# Patient Record
Sex: MALE | Race: WHITE | NOT HISPANIC OR LATINO | URBAN - METROPOLITAN AREA
[De-identification: names, ages, dates, MRNs, and addresses within clinical notes are randomized per-mention and may not be internally consistent; named-entity substitution may affect disease eponyms.]

---

## 2017-01-16 ENCOUNTER — FOLLOW UP ESTABLISHED (OUTPATIENT)
Dept: URBAN - METROPOLITAN AREA CLINIC 64 | Facility: CLINIC | Age: 57
End: 2017-01-16

## 2017-01-16 DIAGNOSIS — H52.223 REGULAR ASTIGMATISM, BILATERAL: Primary | ICD-10-CM

## 2017-01-16 PROCEDURE — S0621 ROUTINE OPHTHALMOLOGICAL EXA: HCPCS | Performed by: OPTOMETRIST

## 2017-01-16 ASSESSMENT — VISUAL ACUITY
OD: 20/20
OS: 20/20

## 2017-01-16 ASSESSMENT — INTRAOCULAR PRESSURE
OS: 22
OD: 21

## 2017-01-16 ASSESSMENT — KERATOMETRY
OD: 37.88
OS: 38.70

## 2018-04-05 ENCOUNTER — FOLLOW UP ESTABLISHED (OUTPATIENT)
Dept: URBAN - METROPOLITAN AREA CLINIC 64 | Facility: CLINIC | Age: 58
End: 2018-04-05
Payer: COMMERCIAL

## 2018-04-05 DIAGNOSIS — H52.213 IRREGULAR ASTIGMATISM, BILATERAL: Primary | ICD-10-CM

## 2018-04-05 PROCEDURE — 92015 DETERMINE REFRACTIVE STATE: CPT | Performed by: OPTOMETRIST

## 2018-04-05 PROCEDURE — 92014 COMPRE OPH EXAM EST PT 1/>: CPT | Performed by: OPTOMETRIST

## 2018-04-05 ASSESSMENT — VISUAL ACUITY
OS: 20/25
OD: 20/25

## 2018-04-05 ASSESSMENT — INTRAOCULAR PRESSURE
OS: 18
OD: 13

## 2018-04-05 ASSESSMENT — KERATOMETRY
OD: 37.42
OS: 38.94

## 2019-05-07 ENCOUNTER — FOLLOW UP ESTABLISHED (OUTPATIENT)
Dept: URBAN - METROPOLITAN AREA CLINIC 64 | Facility: CLINIC | Age: 59
End: 2019-05-07
Payer: COMMERCIAL

## 2019-05-07 PROCEDURE — 92015 DETERMINE REFRACTIVE STATE: CPT | Performed by: OPTOMETRIST

## 2019-05-07 PROCEDURE — 92014 COMPRE OPH EXAM EST PT 1/>: CPT | Performed by: OPTOMETRIST

## 2019-05-07 ASSESSMENT — KERATOMETRY
OD: 37.19
OS: 38.64

## 2019-05-07 ASSESSMENT — VISUAL ACUITY
OD: 20/20
OS: 20/20

## 2019-05-07 ASSESSMENT — INTRAOCULAR PRESSURE
OS: 19
OD: 16

## 2020-05-19 ENCOUNTER — FOLLOW UP ESTABLISHED (OUTPATIENT)
Dept: URBAN - METROPOLITAN AREA CLINIC 64 | Facility: CLINIC | Age: 60
End: 2020-05-19
Payer: COMMERCIAL

## 2020-05-19 PROCEDURE — 92014 COMPRE OPH EXAM EST PT 1/>: CPT | Performed by: OPTOMETRIST

## 2020-05-19 PROCEDURE — 92015 DETERMINE REFRACTIVE STATE: CPT | Performed by: OPTOMETRIST

## 2020-05-19 ASSESSMENT — INTRAOCULAR PRESSURE
OS: 18
OD: 16

## 2020-05-19 ASSESSMENT — VISUAL ACUITY
OS: 20/30
OD: 20/30

## 2020-05-19 ASSESSMENT — KERATOMETRY
OS: 38.22
OD: 37.12

## 2021-07-09 ENCOUNTER — OFFICE VISIT (OUTPATIENT)
Dept: URBAN - METROPOLITAN AREA CLINIC 64 | Facility: CLINIC | Age: 61
End: 2021-07-09
Payer: COMMERCIAL

## 2021-07-09 DIAGNOSIS — H52.03 HYPERMETROPIA, BILATERAL: Primary | ICD-10-CM

## 2021-07-09 PROCEDURE — 92014 COMPRE OPH EXAM EST PT 1/>: CPT | Performed by: OPTOMETRIST

## 2021-07-09 ASSESSMENT — KERATOMETRY
OD: 36.78
OS: 38.18

## 2021-07-09 ASSESSMENT — VISUAL ACUITY
OD: 20/25
OS: 20/20

## 2021-07-09 ASSESSMENT — INTRAOCULAR PRESSURE
OS: 13
OD: 16

## 2021-07-09 NOTE — IMPRESSION/PLAN
Impression: Irregular astigmatism, bilateral Plan: S/P RK OU. Another hyperopic shift OU. AM exam time. Adjusted glasses Rx slightly.

## 2021-11-01 ENCOUNTER — OFFICE VISIT (OUTPATIENT)
Dept: URBAN - METROPOLITAN AREA CLINIC 64 | Facility: CLINIC | Age: 61
End: 2021-11-01
Payer: COMMERCIAL

## 2021-11-01 DIAGNOSIS — H25.13 AGE-RELATED NUCLEAR CATARACT, BILATERAL: ICD-10-CM

## 2021-11-01 DIAGNOSIS — E11.9 TYPE 2 DIABETES MELLITUS W/O COMPLICATION: Primary | ICD-10-CM

## 2021-11-01 PROCEDURE — 99203 OFFICE O/P NEW LOW 30 MIN: CPT | Performed by: OPHTHALMOLOGY

## 2021-11-01 PROCEDURE — 92134 CPTRZ OPH DX IMG PST SGM RTA: CPT | Performed by: OPHTHALMOLOGY

## 2021-11-01 ASSESSMENT — INTRAOCULAR PRESSURE
OS: 17
OD: 18

## 2021-11-01 NOTE — IMPRESSION/PLAN
Impression: Age-related nuclear cataract, bilateral: H25.13. Plan: Early and mild. No treatment required. Monitor annually.

## 2021-11-01 NOTE — IMPRESSION/PLAN
Impression: Type 2 diabetes mellitus w/o complication: I15.5. Plan: No signs of diabetic retinal changes. No retinopathy, no diabetic macular edema. Normal OCT of macula. No treatment indicated at this time. Discussed ocular and systemic benefits of good BG control. Last A1C 6.2. Recommend annual diabetic eye exams.

## 2021-11-01 NOTE — IMPRESSION/PLAN
Impression: Irregular astigmatism, bilateral Plan: S/P RK OU. Doing well with current habitual correction.

## 2022-07-25 ENCOUNTER — OFFICE VISIT (OUTPATIENT)
Dept: URBAN - METROPOLITAN AREA CLINIC 64 | Facility: CLINIC | Age: 62
End: 2022-07-25
Payer: COMMERCIAL

## 2022-07-25 DIAGNOSIS — E11.9 DIABETES MELLITUS TYPE 2 WITHOUT MENTION OF COMPLICATION: ICD-10-CM

## 2022-07-25 DIAGNOSIS — H52.03 HYPERMETROPIA, BILATERAL: Primary | ICD-10-CM

## 2022-07-25 DIAGNOSIS — H25.13 AGE-RELATED NUCLEAR CATARACT, BILATERAL: ICD-10-CM

## 2022-07-25 PROCEDURE — 92014 COMPRE OPH EXAM EST PT 1/>: CPT | Performed by: OPTOMETRIST

## 2022-07-25 ASSESSMENT — INTRAOCULAR PRESSURE
OD: 16
OS: 14

## 2022-07-25 ASSESSMENT — VISUAL ACUITY
OS: 20/20
OD: 20/25

## 2022-07-25 NOTE — IMPRESSION/PLAN
Impression: Diabetes mellitus Type 2 without mention of complication: Q21.4. Plan: No Diabetic Retinopathy, no Diabetic Macular Edema and no Neovascularization of the iris, disc, or elsewhere. Discussed ocular and systemic benefits of blood sugar control. Check annually.  Last A1C 5.8